# Patient Record
Sex: FEMALE | Race: WHITE | NOT HISPANIC OR LATINO | Employment: OTHER | URBAN - METROPOLITAN AREA
[De-identification: names, ages, dates, MRNs, and addresses within clinical notes are randomized per-mention and may not be internally consistent; named-entity substitution may affect disease eponyms.]

---

## 2017-03-09 ENCOUNTER — TRANSCRIBE ORDERS (OUTPATIENT)
Dept: ADMINISTRATIVE | Facility: HOSPITAL | Age: 77
End: 2017-03-09

## 2017-03-09 DIAGNOSIS — R10.32 LEFT LOWER QUADRANT PAIN: Primary | ICD-10-CM

## 2017-03-13 ENCOUNTER — HOSPITAL ENCOUNTER (OUTPATIENT)
Dept: RADIOLOGY | Facility: HOSPITAL | Age: 77
Discharge: HOME | End: 2017-03-13
Payer: MEDICARE

## 2017-03-13 DIAGNOSIS — R10.32 LEFT LOWER QUADRANT PAIN: ICD-10-CM

## 2017-03-13 PROCEDURE — 76830 TRANSVAGINAL US NON-OB: CPT

## 2017-03-13 PROCEDURE — 76770 US EXAM ABDO BACK WALL COMP: CPT

## 2017-03-13 PROCEDURE — 76856 US EXAM PELVIC COMPLETE: CPT

## 2020-05-24 ENCOUNTER — HOSPITAL ENCOUNTER (EMERGENCY)
Facility: HOSPITAL | Age: 80
Discharge: HOME | End: 2020-05-24
Attending: EMERGENCY MEDICINE | Admitting: EMERGENCY MEDICINE
Payer: MEDICARE

## 2020-05-24 VITALS
WEIGHT: 150 LBS | OXYGEN SATURATION: 97 % | SYSTOLIC BLOOD PRESSURE: 148 MMHG | TEMPERATURE: 97.2 F | RESPIRATION RATE: 18 BRPM | DIASTOLIC BLOOD PRESSURE: 92 MMHG | HEART RATE: 96 BPM

## 2020-05-24 DIAGNOSIS — I48.91 ATRIAL FIBRILLATION (HCC): ICD-10-CM

## 2020-05-24 DIAGNOSIS — K08.89 PAIN, DENTAL: Primary | ICD-10-CM

## 2020-05-24 DIAGNOSIS — I10 HYPERTENSION: ICD-10-CM

## 2020-05-24 LAB
ATRIAL RATE: 92 BPM
QRS AXIS: -82 DEGREES
QRSD INTERVAL: 86 MS
QT INTERVAL: 334 MS
QTC INTERVAL: 430 MS
T WAVE AXIS: 53 DEGREES
VENTRICULAR RATE: 100 BPM

## 2020-05-24 PROCEDURE — 93005 ELECTROCARDIOGRAM TRACING: CPT

## 2020-05-24 PROCEDURE — 93010 ELECTROCARDIOGRAM REPORT: CPT | Performed by: INTERNAL MEDICINE

## 2020-05-24 PROCEDURE — 99283 EMERGENCY DEPT VISIT LOW MDM: CPT

## 2020-05-24 PROCEDURE — 99284 EMERGENCY DEPT VISIT MOD MDM: CPT | Performed by: EMERGENCY MEDICINE

## 2020-05-24 RX ORDER — ACETAMINOPHEN 325 MG/1
975 TABLET ORAL ONCE
Status: COMPLETED | OUTPATIENT
Start: 2020-05-24 | End: 2020-05-24

## 2020-05-24 RX ORDER — AMOXICILLIN AND CLAVULANATE POTASSIUM 875; 125 MG/1; MG/1
1 TABLET, FILM COATED ORAL 2 TIMES DAILY
Qty: 19 TABLET | Refills: 0 | Status: SHIPPED | OUTPATIENT
Start: 2020-05-24 | End: 2020-05-24 | Stop reason: SDUPTHER

## 2020-05-24 RX ORDER — OXYCODONE HYDROCHLORIDE 5 MG/1
2.5 TABLET ORAL
Qty: 6 TABLET | Refills: 0 | Status: SHIPPED | OUTPATIENT
Start: 2020-05-24 | End: 2020-06-05

## 2020-05-24 RX ORDER — AMOXICILLIN AND CLAVULANATE POTASSIUM 875; 125 MG/1; MG/1
1 TABLET, FILM COATED ORAL 2 TIMES DAILY
Qty: 19 TABLET | Refills: 0 | Status: SHIPPED | OUTPATIENT
Start: 2020-05-24 | End: 2020-06-03

## 2020-05-24 RX ORDER — OXYCODONE HYDROCHLORIDE 5 MG/1
2.5 TABLET ORAL
Qty: 6 TABLET | Refills: 0 | Status: SHIPPED | OUTPATIENT
Start: 2020-05-24 | End: 2020-05-24 | Stop reason: SDUPTHER

## 2020-05-24 RX ORDER — AMOXICILLIN AND CLAVULANATE POTASSIUM 875; 125 MG/1; MG/1
1 TABLET, FILM COATED ORAL ONCE
Status: COMPLETED | OUTPATIENT
Start: 2020-05-24 | End: 2020-05-24

## 2020-05-24 RX ADMIN — ACETAMINOPHEN 975 MG: 325 TABLET ORAL at 01:46

## 2020-05-24 RX ADMIN — AMOXICILLIN AND CLAVULANATE POTASSIUM 1 TABLET: 875; 125 TABLET, FILM COATED ORAL at 01:46

## 2022-05-27 ENCOUNTER — APPOINTMENT (EMERGENCY)
Dept: RADIOLOGY | Facility: HOSPITAL | Age: 82
End: 2022-05-27
Payer: MEDICARE

## 2022-05-27 ENCOUNTER — HOSPITAL ENCOUNTER (EMERGENCY)
Facility: HOSPITAL | Age: 82
Discharge: HOME | End: 2022-05-27
Attending: EMERGENCY MEDICINE
Payer: MEDICARE

## 2022-05-27 VITALS
DIASTOLIC BLOOD PRESSURE: 78 MMHG | HEART RATE: 120 BPM | OXYGEN SATURATION: 96 % | SYSTOLIC BLOOD PRESSURE: 167 MMHG | TEMPERATURE: 99.5 F | RESPIRATION RATE: 20 BRPM

## 2022-05-27 DIAGNOSIS — J18.9 RIGHT MIDDLE LOBE PNEUMONIA: Primary | ICD-10-CM

## 2022-05-27 LAB
2HR DELTA HS TROPONIN: 1 NG/L
ALBUMIN SERPL BCP-MCNC: 4.4 G/DL (ref 3.5–5)
ALP SERPL-CCNC: 136 U/L (ref 34–104)
ALT SERPL W P-5'-P-CCNC: 146 U/L (ref 7–52)
ANION GAP SERPL CALCULATED.3IONS-SCNC: 12 MMOL/L (ref 4–13)
AST SERPL W P-5'-P-CCNC: 70 U/L (ref 13–39)
BASOPHILS # BLD AUTO: 0.02 THOUSANDS/ΜL (ref 0–0.1)
BASOPHILS NFR BLD AUTO: 0 % (ref 0–1)
BILIRUB SERPL-MCNC: 1.86 MG/DL (ref 0.2–1)
BUN SERPL-MCNC: 9 MG/DL (ref 5–25)
CALCIUM SERPL-MCNC: 9.8 MG/DL (ref 8.4–10.2)
CARDIAC TROPONIN I PNL SERPL HS: 7 NG/L
CARDIAC TROPONIN I PNL SERPL HS: 8 NG/L
CHLORIDE SERPL-SCNC: 96 MMOL/L (ref 96–108)
CO2 SERPL-SCNC: 25 MMOL/L (ref 21–32)
CREAT SERPL-MCNC: 0.8 MG/DL (ref 0.6–1.3)
EOSINOPHIL # BLD AUTO: 0.01 THOUSAND/ΜL (ref 0–0.61)
EOSINOPHIL NFR BLD AUTO: 0 % (ref 0–6)
ERYTHROCYTE [DISTWIDTH] IN BLOOD BY AUTOMATED COUNT: 13.2 % (ref 11.6–15.1)
FLUAV RNA RESP QL NAA+PROBE: NEGATIVE
FLUBV RNA RESP QL NAA+PROBE: NEGATIVE
GFR SERPL CREATININE-BSD FRML MDRD: 69 ML/MIN/1.73SQ M
GLUCOSE SERPL-MCNC: 124 MG/DL (ref 65–140)
HCT VFR BLD AUTO: 42.2 % (ref 34.8–46.1)
HGB BLD-MCNC: 14.5 G/DL (ref 11.5–15.4)
IMM GRANULOCYTES # BLD AUTO: 0.06 THOUSAND/UL (ref 0–0.2)
IMM GRANULOCYTES NFR BLD AUTO: 1 % (ref 0–2)
LACTATE SERPL-SCNC: 1 MMOL/L (ref 0.5–2)
LYMPHOCYTES # BLD AUTO: 1.8 THOUSANDS/ΜL (ref 0.6–4.47)
LYMPHOCYTES NFR BLD AUTO: 16 % (ref 14–44)
MCH RBC QN AUTO: 29.8 PG (ref 26.8–34.3)
MCHC RBC AUTO-ENTMCNC: 34.4 G/DL (ref 31.4–37.4)
MCV RBC AUTO: 87 FL (ref 82–98)
MONOCYTES # BLD AUTO: 1.29 THOUSAND/ΜL (ref 0.17–1.22)
MONOCYTES NFR BLD AUTO: 11 % (ref 4–12)
NEUTROPHILS # BLD AUTO: 8.17 THOUSANDS/ΜL (ref 1.85–7.62)
NEUTS SEG NFR BLD AUTO: 72 % (ref 43–75)
NRBC BLD AUTO-RTO: 0 /100 WBCS
PLATELET # BLD AUTO: 191 THOUSANDS/UL (ref 149–390)
PMV BLD AUTO: 8.9 FL (ref 8.9–12.7)
POTASSIUM SERPL-SCNC: 3.6 MMOL/L (ref 3.5–5.3)
PROT SERPL-MCNC: 8 G/DL (ref 6.4–8.4)
RBC # BLD AUTO: 4.87 MILLION/UL (ref 3.81–5.12)
RSV RNA RESP QL NAA+PROBE: NEGATIVE
SARS-COV-2 RNA RESP QL NAA+PROBE: NEGATIVE
SODIUM SERPL-SCNC: 133 MMOL/L (ref 135–147)
WBC # BLD AUTO: 11.35 THOUSAND/UL (ref 4.31–10.16)

## 2022-05-27 PROCEDURE — 99285 EMERGENCY DEPT VISIT HI MDM: CPT | Performed by: EMERGENCY MEDICINE

## 2022-05-27 PROCEDURE — 96366 THER/PROPH/DIAG IV INF ADDON: CPT

## 2022-05-27 PROCEDURE — 83605 ASSAY OF LACTIC ACID: CPT | Performed by: EMERGENCY MEDICINE

## 2022-05-27 PROCEDURE — 87040 BLOOD CULTURE FOR BACTERIA: CPT | Performed by: EMERGENCY MEDICINE

## 2022-05-27 PROCEDURE — 93005 ELECTROCARDIOGRAM TRACING: CPT

## 2022-05-27 PROCEDURE — 80053 COMPREHEN METABOLIC PANEL: CPT | Performed by: EMERGENCY MEDICINE

## 2022-05-27 PROCEDURE — 84484 ASSAY OF TROPONIN QUANT: CPT | Performed by: EMERGENCY MEDICINE

## 2022-05-27 PROCEDURE — 71045 X-RAY EXAM CHEST 1 VIEW: CPT

## 2022-05-27 PROCEDURE — 96365 THER/PROPH/DIAG IV INF INIT: CPT

## 2022-05-27 PROCEDURE — 36415 COLL VENOUS BLD VENIPUNCTURE: CPT | Performed by: EMERGENCY MEDICINE

## 2022-05-27 PROCEDURE — 85025 COMPLETE CBC W/AUTO DIFF WBC: CPT | Performed by: EMERGENCY MEDICINE

## 2022-05-27 PROCEDURE — 99285 EMERGENCY DEPT VISIT HI MDM: CPT

## 2022-05-27 PROCEDURE — 0241U HB NFCT DS VIR RESP RNA 4 TRGT: CPT | Performed by: EMERGENCY MEDICINE

## 2022-05-27 RX ORDER — ACETAMINOPHEN 325 MG/1
975 TABLET ORAL ONCE
Status: COMPLETED | OUTPATIENT
Start: 2022-05-27 | End: 2022-05-27

## 2022-05-27 RX ORDER — AZITHROMYCIN 250 MG/1
TABLET, FILM COATED ORAL
Qty: 6 TABLET | Refills: 0 | Status: SHIPPED | OUTPATIENT
Start: 2022-05-27 | End: 2022-05-27 | Stop reason: SDUPTHER

## 2022-05-27 RX ORDER — IBUPROFEN 400 MG/1
400 TABLET ORAL ONCE
Status: COMPLETED | OUTPATIENT
Start: 2022-05-27 | End: 2022-05-27

## 2022-05-27 RX ORDER — AZITHROMYCIN 500 MG/1
500 TABLET, FILM COATED ORAL ONCE
Status: COMPLETED | OUTPATIENT
Start: 2022-05-27 | End: 2022-05-27

## 2022-05-27 RX ORDER — AZITHROMYCIN 250 MG/1
TABLET, FILM COATED ORAL
Qty: 6 TABLET | Refills: 0 | Status: SHIPPED | OUTPATIENT
Start: 2022-05-27 | End: 2022-05-31

## 2022-05-27 RX ADMIN — IBUPROFEN 400 MG: 400 TABLET, FILM COATED ORAL at 18:11

## 2022-05-27 RX ADMIN — AZITHROMYCIN 500 MG: 500 TABLET, FILM COATED ORAL at 18:57

## 2022-05-27 RX ADMIN — ACETAMINOPHEN 975 MG: 325 TABLET, FILM COATED ORAL at 18:06

## 2022-05-27 RX ADMIN — SODIUM CHLORIDE, SODIUM LACTATE, POTASSIUM CHLORIDE, AND CALCIUM CHLORIDE 1000 ML: .6; .31; .03; .02 INJECTION, SOLUTION INTRAVENOUS at 17:13

## 2022-05-27 NOTE — ED PROVIDER NOTES
History  Chief Complaint   Patient presents with    Flu Symptoms     Pt presents w/ flu-like symptoms, cough, fevers, achy  starting 4 days  Pt also complains of headache   Abnormal ECG     [facility], recommended coming to ER  Denies chest pain or palpitations  3 days of fever, malaise, cough, headache  Seen at [address] and noted to be in Afib RVR  Pt has been in Afib intermittently in the past, not aware of when rhythm might have changed  No cp/sob/n/v/d/abdo pain/urinary symp  Tested at [facility] negative for Covid and Flu A, but positive for Flu B on antigen tests  None       Past Medical History:   Diagnosis Date    A-fib [facility])        Past Surgical History:   Procedure Laterality Date    HYSTERECTOMY      TONSILLECTOMY         Family History   Problem Relation Age of Onset    Heart attack Father     Atrial fibrillation Family      I have reviewed and agree with the history as documented  E-Cigarette/Vaping     E-Cigarette/Vaping Substances     Social History     Tobacco Use    Smoking status: Never Smoker    Smokeless tobacco: Never Used   Substance Use Topics    Alcohol use: Yes     Comment: occasionally    Drug use: Never       Review of Systems   Constitutional: Positive for activity change, appetite change, chills, fatigue and fever  HENT: Positive for sore throat  Negative for rhinorrhea  Eyes: Negative for visual disturbance  Respiratory: Positive for cough  Negative for shortness of breath  Cardiovascular: Negative for chest pain  Gastrointestinal: Negative for abdominal pain, diarrhea and vomiting  Endocrine: Negative for polydipsia  Genitourinary: Negative for dysuria, frequency and hematuria  Musculoskeletal: Positive for myalgias  Negative for neck stiffness  Skin: Negative for rash  Allergic/Immunologic: Negative for immunocompromised state  Neurological: Positive for headaches   Negative for speech difficulty, weakness and numbness  Psychiatric/Behavioral: Negative for suicidal ideas  Physical Exam  Physical Exam  Constitutional:       General: She is not in acute distress  HENT:      Head: Normocephalic and atraumatic  Right Ear: External ear normal       Left Ear: External ear normal       Nose: Nose normal       Mouth/Throat:      Pharynx: Oropharynx is clear  Eyes:      Conjunctiva/sclera: Conjunctivae normal    Cardiovascular:      Rate and Rhythm: Tachycardia present  Rhythm irregular  Heart sounds: Normal heart sounds  No murmur heard  Pulmonary:      Effort: Pulmonary effort is normal       Breath sounds: Normal breath sounds  Abdominal:      General: Bowel sounds are normal       Palpations: Abdomen is soft  There is no mass  Tenderness: There is no abdominal tenderness  There is no guarding  Musculoskeletal:         General: No swelling or tenderness  Cervical back: Normal range of motion and neck supple  Right lower leg: No edema  Left lower leg: No edema  Skin:     General: Skin is warm and dry  Capillary Refill: Capillary refill takes less than 2 seconds  Neurological:      General: No focal deficit present  Mental Status: She is alert and oriented to person, place, and time     Psychiatric:         Mood and Affect: Mood normal          Vital Signs  ED Triage Vitals   Temperature Pulse Respirations Blood Pressure SpO2   05/27/22 1732 05/27/22 1638 05/27/22 1638 05/27/22 1638 05/27/22 1638   99 5 °F (37 5 °C) (!) 120 20 167/78 96 %      Temp Source Heart Rate Source Patient Position - Orthostatic VS BP Location FiO2 (%)   05/27/22 1732 05/27/22 1638 05/27/22 1638 05/27/22 1638 --   Oral Monitor Lying Left arm       Pain Score       05/27/22 1806       7           Vitals:    05/27/22 1638   BP: 167/78   Pulse: (!) 120   Patient Position - Orthostatic VS: Lying         Visual Acuity      ED Medications  Medications   lactated ringers bolus 1,000 mL (0 mL Intravenous Stopped 5/27/22 2000)   acetaminophen (TYLENOL) tablet 975 mg (975 mg Oral Given 5/27/22 1806)   ibuprofen (MOTRIN) tablet 400 mg (400 mg Oral Given 5/27/22 1811)   azithromycin (ZITHROMAX) tablet 500 mg (500 mg Oral Given 5/27/22 1857)       Diagnostic Studies  Results Reviewed     Procedure Component Value Units Date/Time    HS Troponin I 2hr [[id number]]  (Normal) Collected: 05/27/22 1857    Lab Status: Final result Specimen: Blood from Line, Venous Updated: 05/27/22 1924     hs TnI 2hr 8 ng/L      Delta 2hr hsTnI 1 ng/L     COVID/FLU/RSV - 2 hour TAT [[id number]]  (Normal) Collected: 05/27/22 1709    Lab Status: Final result Specimen: Nasopharyngeal Swab Updated: 05/27/22 1756     SARS-CoV-2 Negative     INFLUENZA A PCR Negative     INFLUENZA B PCR Negative     RSV PCR Negative    Narrative:      FOR PEDIATRIC PATIENTS - copy/paste COVID Guidelines URL to browser: [email or web address]    SARS-CoV-2 assay is a Nucleic Acid Amplification assay intended for the  qualitative detection of nucleic acid from SARS-CoV-2 in nasopharyngeal  swabs  Results are for the presumptive identification of SARS-CoV-2 RNA  Positive results are indicative of infection with SARS-CoV-2, the virus  causing COVID-19, but do not rule out bacterial infection or co-infection  with other viruses  Laboratories within the [place] and its  territories are required to report all positive results to the appropriate  public health authorities  Negative results do not preclude SARS-CoV-2  infection and should not be used as the sole basis for treatment or other  patient management decisions  Negative results must be combined with  clinical observations, patient history, and epidemiological information  This test has not been FDA cleared or approved  This test has been authorized by FDA under an Emergency Use Authorization  (EUA)   This test is only authorized for the duration of time the  declaration that circumstances exist justifying the authorization of the  emergency use of an in vitro diagnostic tests for detection of SARS-CoV-2  virus and/or diagnosis of COVID-19 infection under section 564(b)(1) of  the Act, 21 U  S C  952WPK-8(U)(5), unless the authorization is terminated  or revoked sooner  The test has been validated but independent review by FDA  and CLIA is pending  Test performed using [employer]: This RT-PCR assay targets N2,  a region unique to SARS-CoV-2  A conserved region in the E-gene was chosen  for pan-Sarbecovirus detection which includes SARS-CoV-2      HS Troponin 0hr (reflex protocol) [[id number]]  (Normal) Collected: 05/27/22 1709    Lab Status: Final result Specimen: Blood from Arm, Left Updated: 05/27/22 1742     hs TnI 0hr 7 ng/L     Comprehensive metabolic panel [[id number]]  (Abnormal) Collected: 05/27/22 1709    Lab Status: Final result Specimen: Blood from Arm, Left Updated: 05/27/22 1738     Sodium 133 mmol/L      Potassium 3 6 mmol/L      Chloride 96 mmol/L      CO2 25 mmol/L      ANION GAP 12 mmol/L      BUN 9 mg/dL      Creatinine 0 80 mg/dL      Glucose 124 mg/dL      Calcium 9 8 mg/dL      AST 70 U/L       U/L      Alkaline Phosphatase 136 U/L      Total Protein 8 0 g/dL      Albumin 4 4 g/dL      Total Bilirubin 1 86 mg/dL      eGFR 69 ml/min/1 73sq m     Narrative:      [place] guidelines for Chronic Kidney Disease (CKD):     Stage 1 with normal or high GFR (GFR > 90 mL/min/1 73 square meters)    Stage 2 Mild CKD (GFR = 60-89 mL/min/1 73 square meters)    Stage 3A Moderate CKD (GFR = 45-59 mL/min/1 73 square meters)    Stage 3B Moderate CKD (GFR = 30-44 mL/min/1 73 square meters)    Stage 4 Severe CKD (GFR = 15-29 mL/min/1 73 square meters)    Stage 5 End Stage CKD (GFR <15 mL/min/1 73 square meters)  Note: GFR calculation is accurate only with a steady state creatinine    Lactic acid [[id number]] (Normal) Collected: 05/27/22 1709    Lab Status: Final result Specimen: Blood from Arm, Left Updated: 05/27/22 1738     LACTIC ACID 1 0 mmol/L     Narrative:      Result may be elevated if tourniquet was used during collection  Blood culture #2 [[id number]] Collected: 05/27/22 1709    Lab Status: In process Specimen: Blood from Arm, Left Updated: 05/27/22 1720    Blood culture #1 [[id number]] Collected: 05/27/22 1718    Lab Status: In process Specimen: Blood from Hand, Right Updated: 05/27/22 1720    CBC and differential [[id number]]  (Abnormal) Collected: 05/27/22 1709    Lab Status: Final result Specimen: Blood from Arm, Left Updated: 05/27/22 1718     WBC 11 35 Thousand/uL      RBC 4 87 Million/uL      Hemoglobin 14 5 g/dL      Hematocrit 42 2 %      MCV 87 fL      MCH 29 8 pg      MCHC 34 4 g/dL      RDW 13 2 %      MPV 8 9 fL      Platelets 527 Thousands/uL      nRBC 0 /100 WBCs      Neutrophils Relative 72 %      Immat GRANS % 1 %      Lymphocytes Relative 16 %      Monocytes Relative 11 %      Eosinophils Relative 0 %      Basophils Relative 0 %      Neutrophils Absolute 8 17 Thousands/µL      Immature Grans Absolute 0 06 Thousand/uL      Lymphocytes Absolute 1 80 Thousands/µL      Monocytes Absolute 1 29 Thousand/µL      Eosinophils Absolute 0 01 Thousand/µL      Basophils Absolute 0 02 Thousands/µL                  XR chest 1 view portable   ED Interpretation by Naomy Dang MD (05/27 1832)   RML patchy opacity                 Procedures  Procedures         ED Course                               SBIRT 20yo+    Flowsheet Row Most Recent Value   SBIRT (23 yo +)    In order to provide better care to our patients, we are screening all of our patients for alcohol and drug use  Would it be okay to ask you these screening questions?  No Filed at: 05/27/2022 1639                    MDM  Number of Diagnoses or Management Options  Right middle lobe pneumonia  Diagnosis management comments: RML PNA CAP, negative for flu b here and given no local prevalence likely true negative  Rx for CAP  Disposition  Final diagnoses:   Right middle lobe pneumonia     Time reflects when diagnosis was documented in both MDM as applicable and the Disposition within this note     Time User Action Codes Description Comment    5/27/2022  7:40 PM Stephanie Cuellarxon Alec [J18 9] Right middle lobe pneumonia       ED Disposition     ED Disposition   Discharge    Condition   Stable    Date/Time   Fri May 27, 2022  7:39 PM    Comment   Dewight Habermann discharge to home/self care  Follow-up Information     Follow up With Specialties Details Why Contact Info Additional Information    [facility] Schedule an appointment as soon as possible for a visit in 3 days  [address] [id number]  [address]  , [address], [address]          Discharge Medication List as of 5/27/2022  7:40 PM      START taking these medications    Details   azithromycin (ZITHROMAX) 250 mg tablet Take 2 tablets today then 1 tablet daily x 4 days, Normal             No discharge procedures on file      PDMP Review     None          ED Provider  Electronically Signed by           Brenda Billings MD  05/27/22 2758

## 2022-05-28 NOTE — ED NOTES
Discharge instructions reviewed with pt  Pt verbalized understanding  And has no further questions at this time  Pt ambulatory off unit with steady gait        Laverne Hernandez RN  05/27/22 2002

## 2022-05-29 LAB
QRS AXIS: -82 DEGREES
QRSD INTERVAL: 80 MS
QT INTERVAL: 276 MS
QTC INTERVAL: 378 MS
T WAVE AXIS: 59 DEGREES
VENTRICULAR RATE: 113 BPM

## 2022-05-29 PROCEDURE — 93010 ELECTROCARDIOGRAM REPORT: CPT | Performed by: INTERNAL MEDICINE

## 2022-05-31 ENCOUNTER — OFFICE VISIT (OUTPATIENT)
Dept: URGENT CARE | Facility: CLINIC | Age: 82
End: 2022-05-31
Payer: MEDICARE

## 2022-05-31 VITALS — TEMPERATURE: 98.1 F | RESPIRATION RATE: 20 BRPM | HEART RATE: 81 BPM | OXYGEN SATURATION: 97 %

## 2022-05-31 DIAGNOSIS — J18.9 PNEUMONIA OF RIGHT MIDDLE LOBE DUE TO INFECTIOUS ORGANISM: ICD-10-CM

## 2022-05-31 DIAGNOSIS — J18.9 PNEUMONIA OF RIGHT MIDDLE LOBE DUE TO INFECTIOUS ORGANISM: Primary | ICD-10-CM

## 2022-05-31 PROCEDURE — 99213 OFFICE O/P EST LOW 20 MIN: CPT | Performed by: NURSE PRACTITIONER

## 2022-05-31 PROCEDURE — G0463 HOSPITAL OUTPT CLINIC VISIT: HCPCS | Performed by: NURSE PRACTITIONER

## 2022-05-31 RX ORDER — AMOXICILLIN 500 MG/1
1000 CAPSULE ORAL EVERY 8 HOURS SCHEDULED
Qty: 42 CAPSULE | Refills: 0 | Status: SHIPPED | OUTPATIENT
Start: 2022-05-31 | End: 2022-06-07

## 2022-05-31 RX ORDER — GUAIFENESIN AND CODEINE PHOSPHATE 100; 10 MG/5ML; MG/5ML
SOLUTION ORAL
Qty: 180 ML | Refills: 0 | Status: SHIPPED | OUTPATIENT
Start: 2022-05-31 | End: 2022-05-31 | Stop reason: SDUPTHER

## 2022-05-31 RX ORDER — GUAIFENESIN AND CODEINE PHOSPHATE 100; 10 MG/5ML; MG/5ML
SOLUTION ORAL
Qty: 180 ML | Refills: 0 | Status: SHIPPED | OUTPATIENT
Start: 2022-05-31

## 2022-05-31 NOTE — PATIENT INSTRUCTIONS
--Rest, drink plenty of fluids  --For cough, you can take an OTC expectorant such as plain Robitussion or Mucinex (active ingredient guaifenesin)  A spoonful of honey at bedtime may also be helpful, as may a prescription cough medicine  Also recommended is the use of a cool mist humidifier (with or without Vicks) in the bedroom at night  --Take additional antibiotic as directed  --You can take Tylenol as needed for fever, headache, body aches  Motrin/Advil should be avoided, however, if you have a history of heart disease, bleeding ulcers, or if you take blood thinners  --Follow-up with PCP in the next 1-2 weeks for reassessment including order for repeat CXR in a month to ensure full resolution of pneumonia   Seek re-evaluation for new/worsening symptoms in the interim

## 2022-05-31 NOTE — PROGRESS NOTES
[facility]        NAME: Satinder Glover is a 80 y o  female  : 1940    MRN: [id number]  DATE: May 31, 2022  TIME: 2:14 PM    Assessment and Plan   Pneumonia of right middle lobe due to infectious organism [J18 9]  1  Pneumonia of right middle lobe due to infectious organism  amoxicillin (AMOXIL) 500 mg capsule    guaifenesin-codeine (GUAIFENESIN AC) 100-10 MG/5ML liquid         Patient Instructions     --Rest, drink plenty of fluids  --Take additional antibiotic as directed  --For cough, you can take an OTC expectorant such as plain Robitussion or Mucinex (active ingredient guaifenesin)  A spoonful of honey at bedtime may also be helpful, as may a prescription cough medicine  Also recommended is the use of a cool mist humidifier (with or without Vicks) in the bedroom at night  --You can take Tylenol as needed for fever, headache, body aches  Motrin/Advil should be avoided, however, if you have a history of heart disease, bleeding ulcers, or if you take blood thinners  --Follow-up with PCP in the next 1-2 weeks for reassessment including order for repeat CXR in a month to ensure full resolution of pneumonia  Seek re-evaluation for new/worsening symptoms in the interim    Chief Complaint     Chief Complaint   Patient presents with    Cough     Started with sx one week ago, slight cough which got worse  Went to ER, given antibiotics  Feels better but wants something for a cough  History of Present Illness       Here with daughter for follow-up to recent pneumonia  Symptoms started   Cough, fever, headache, tiredness  Went to [facility]  Reported positive for influenza B, negative for COVID  No meds given  Went to ER on  after cough got worse  Records reviewed  Repeat PCR testing negative for flu, COVID, RSV  CXR showing patchy RML infiltrate  Started on Z-pack which she continues to take  WBC 11 3  Since then, headache, energy level improved   No further fever    Cough remains persistent, however  Mildly productive of white sputum  Worse at night  No sore throat  Mild nasal congestion  No wheezing  Appetited improved  No N/V/D  Taking Robitussin  Review of Systems   Review of Systems   Constitutional: Negative for fever  HENT: Positive for rhinorrhea  Negative for sore throat  Respiratory: Positive for cough  Negative for shortness of breath and wheezing  Gastrointestinal: Negative for abdominal pain, nausea and vomiting  Musculoskeletal: Negative for myalgias  Neurological: Negative for headaches  Current Medications       Current Outpatient Medications:     amoxicillin (AMOXIL) 500 mg capsule, Take 2 capsules (1,000 mg total) by mouth every 8 (eight) hours for 7 days, Disp: 42 capsule, Rfl: 0    azithromycin (ZITHROMAX) 250 mg tablet, Take 2 tablets today then 1 tablet daily x 4 days, Disp: 6 tablet, Rfl: 0    guaifenesin-codeine (GUAIFENESIN AC) 100-10 MG/5ML liquid, TAKE 1-2 TSP BY  MOUTH EVERY 4-6 HOURS AS NEEDED FOR COUGH, Disp: 180 mL, Rfl: 0    Current Allergies     Allergies as of 05/31/2022    (No Known Allergies)            The following portions of the patient's history were reviewed and updated as appropriate: allergies, current medications, past family history, past medical history, past social history, past surgical history and problem list      Past Medical History:   Diagnosis Date    A-fib [facility])        Past Surgical History:   Procedure Laterality Date    HYSTERECTOMY      TONSILLECTOMY         Family History   Problem Relation Age of Onset    Heart attack Father     Atrial fibrillation Family          Medications have been verified  Objective   Pulse 81   Temp 98 1 °F (36 7 °C) (Temporal)   Resp 20   SpO2 97%   No LMP recorded  Physical Exam     Physical Exam  Constitutional:       General: She is not in acute distress  Appearance: Normal appearance  She is well-developed   She is not ill-appearing, toxic-appearing or diaphoretic  HENT:      Head: Normocephalic  Right Ear: Tympanic membrane, ear canal and external ear normal       Left Ear: Tympanic membrane, ear canal and external ear normal       Nose: No congestion or rhinorrhea  Mouth/Throat:      Pharynx: No oropharyngeal exudate or posterior oropharyngeal erythema  Eyes:      General:         Right eye: No discharge  Left eye: No discharge  Cardiovascular:      Rate and Rhythm: Normal rate and regular rhythm  Heart sounds: Normal heart sounds  No murmur heard  Pulmonary:      Effort: Pulmonary effort is normal  No respiratory distress  Breath sounds: Normal breath sounds  No stridor  No wheezing, rhonchi or rales  Comments: Breathing non-labored  RR=20  No cough noted  Chest:      Chest wall: No tenderness  Abdominal:      Tenderness: There is no abdominal tenderness  Musculoskeletal:      Cervical back: Neck supple  Lymphadenopathy:      Cervical: No cervical adenopathy  Skin:     General: Skin is warm and dry  Neurological:      Mental Status: She is alert and oriented to person, place, and time  Deep Tendon Reflexes: Reflexes are normal and symmetric     Psychiatric:         Mood and Affect: Mood normal

## 2022-06-02 DIAGNOSIS — J18.9 PNEUMONIA OF RIGHT MIDDLE LOBE DUE TO INFECTIOUS ORGANISM: Primary | ICD-10-CM

## 2022-06-02 LAB
BACTERIA BLD CULT: NORMAL
BACTERIA BLD CULT: NORMAL

## 2022-06-02 RX ORDER — BENZONATATE 200 MG/1
200 CAPSULE ORAL 3 TIMES DAILY PRN
Qty: 20 CAPSULE | Refills: 0 | Status: SHIPPED | OUTPATIENT
Start: 2022-06-02